# Patient Record
Sex: FEMALE | Race: WHITE | NOT HISPANIC OR LATINO | Employment: FULL TIME | URBAN - METROPOLITAN AREA
[De-identification: names, ages, dates, MRNs, and addresses within clinical notes are randomized per-mention and may not be internally consistent; named-entity substitution may affect disease eponyms.]

---

## 2023-04-05 ENCOUNTER — HOSPITAL ENCOUNTER (EMERGENCY)
Facility: CLINIC | Age: 43
Discharge: HOME | End: 2023-04-05
Attending: EMERGENCY MEDICINE | Admitting: EMERGENCY MEDICINE
Payer: COMMERCIAL

## 2023-04-05 ENCOUNTER — APPOINTMENT (OUTPATIENT)
Dept: CT IMAGING | Facility: CLINIC | Age: 43
End: 2023-04-05
Attending: EMERGENCY MEDICINE
Payer: COMMERCIAL

## 2023-04-05 ENCOUNTER — APPOINTMENT (OUTPATIENT)
Dept: MRI IMAGING | Facility: CLINIC | Age: 43
End: 2023-04-05
Attending: EMERGENCY MEDICINE
Payer: COMMERCIAL

## 2023-04-05 VITALS
HEIGHT: 60 IN | SYSTOLIC BLOOD PRESSURE: 111 MMHG | RESPIRATION RATE: 18 BRPM | OXYGEN SATURATION: 97 % | TEMPERATURE: 98.5 F | WEIGHT: 119.05 LBS | BODY MASS INDEX: 23.37 KG/M2 | HEART RATE: 72 BPM | DIASTOLIC BLOOD PRESSURE: 77 MMHG

## 2023-04-05 DIAGNOSIS — M54.50 ACUTE RIGHT-SIDED LOW BACK PAIN WITHOUT SCIATICA: ICD-10-CM

## 2023-04-05 LAB
ALBUMIN UR-MCNC: NEGATIVE MG/DL
ANION GAP SERPL CALCULATED.3IONS-SCNC: 7 MMOL/L (ref 7–15)
APPEARANCE UR: CLEAR
BASOPHILS # BLD AUTO: 0.1 10E3/UL (ref 0–0.2)
BASOPHILS NFR BLD AUTO: 1 %
BILIRUB UR QL STRIP: NEGATIVE
BUN SERPL-MCNC: 8.3 MG/DL (ref 6–20)
CALCIUM SERPL-MCNC: 9.3 MG/DL (ref 8.6–10)
CHLORIDE SERPL-SCNC: 102 MMOL/L (ref 98–107)
COLOR UR AUTO: ABNORMAL
CREAT SERPL-MCNC: 0.71 MG/DL (ref 0.51–0.95)
DEPRECATED HCO3 PLAS-SCNC: 30 MMOL/L (ref 22–29)
EOSINOPHIL # BLD AUTO: 0.1 10E3/UL (ref 0–0.7)
EOSINOPHIL NFR BLD AUTO: 2 %
ERYTHROCYTE [DISTWIDTH] IN BLOOD BY AUTOMATED COUNT: 11.9 % (ref 10–15)
GFR SERPL CREATININE-BSD FRML MDRD: >90 ML/MIN/1.73M2
GLUCOSE SERPL-MCNC: 123 MG/DL (ref 70–99)
GLUCOSE UR STRIP-MCNC: NEGATIVE MG/DL
HCG UR QL: NEGATIVE
HCT VFR BLD AUTO: 40.3 % (ref 35–47)
HGB BLD-MCNC: 12.8 G/DL (ref 11.7–15.7)
HGB UR QL STRIP: ABNORMAL
HOLD SPECIMEN: NORMAL
HOLD SPECIMEN: NORMAL
IMM GRANULOCYTES # BLD: 0 10E3/UL
IMM GRANULOCYTES NFR BLD: 0 %
KETONES UR STRIP-MCNC: NEGATIVE MG/DL
LEUKOCYTE ESTERASE UR QL STRIP: NEGATIVE
LYMPHOCYTES # BLD AUTO: 2.5 10E3/UL (ref 0.8–5.3)
LYMPHOCYTES NFR BLD AUTO: 38 %
MCH RBC QN AUTO: 27.3 PG (ref 26.5–33)
MCHC RBC AUTO-ENTMCNC: 31.8 G/DL (ref 31.5–36.5)
MCV RBC AUTO: 86 FL (ref 78–100)
MONOCYTES # BLD AUTO: 0.5 10E3/UL (ref 0–1.3)
MONOCYTES NFR BLD AUTO: 7 %
MUCOUS THREADS #/AREA URNS LPF: PRESENT /LPF
NEUTROPHILS # BLD AUTO: 3.5 10E3/UL (ref 1.6–8.3)
NEUTROPHILS NFR BLD AUTO: 52 %
NITRATE UR QL: NEGATIVE
NRBC # BLD AUTO: 0 10E3/UL
NRBC BLD AUTO-RTO: 0 /100
PH UR STRIP: 6.5 [PH] (ref 5–7)
PLATELET # BLD AUTO: 242 10E3/UL (ref 150–450)
POTASSIUM SERPL-SCNC: 3.8 MMOL/L (ref 3.4–5.3)
RBC # BLD AUTO: 4.69 10E6/UL (ref 3.8–5.2)
RBC URINE: 3 /HPF
SODIUM SERPL-SCNC: 139 MMOL/L (ref 136–145)
SP GR UR STRIP: 1.01 (ref 1–1.03)
SQUAMOUS EPITHELIAL: <1 /HPF
UROBILINOGEN UR STRIP-MCNC: NORMAL MG/DL
WBC # BLD AUTO: 6.6 10E3/UL (ref 4–11)
WBC URINE: <1 /HPF

## 2023-04-05 PROCEDURE — 74176 CT ABD & PELVIS W/O CONTRAST: CPT

## 2023-04-05 PROCEDURE — 81001 URINALYSIS AUTO W/SCOPE: CPT | Performed by: EMERGENCY MEDICINE

## 2023-04-05 PROCEDURE — 36415 COLL VENOUS BLD VENIPUNCTURE: CPT | Performed by: EMERGENCY MEDICINE

## 2023-04-05 PROCEDURE — 81025 URINE PREGNANCY TEST: CPT | Performed by: EMERGENCY MEDICINE

## 2023-04-05 PROCEDURE — 72148 MRI LUMBAR SPINE W/O DYE: CPT

## 2023-04-05 PROCEDURE — 99285 EMERGENCY DEPT VISIT HI MDM: CPT | Mod: 25

## 2023-04-05 PROCEDURE — 80048 BASIC METABOLIC PNL TOTAL CA: CPT | Performed by: EMERGENCY MEDICINE

## 2023-04-05 PROCEDURE — 85025 COMPLETE CBC W/AUTO DIFF WBC: CPT | Performed by: EMERGENCY MEDICINE

## 2023-04-05 RX ORDER — LORAZEPAM 1 MG/1
1 TABLET ORAL ONCE
Status: DISCONTINUED | OUTPATIENT
Start: 2023-04-05 | End: 2023-04-05

## 2023-04-05 RX ORDER — LORAZEPAM 0.5 MG/1
0.5 TABLET ORAL ONCE
Status: DISCONTINUED | OUTPATIENT
Start: 2023-04-05 | End: 2023-04-05 | Stop reason: HOSPADM

## 2023-04-05 ASSESSMENT — ACTIVITIES OF DAILY LIVING (ADL): ADLS_ACUITY_SCORE: 35

## 2023-04-05 NOTE — ED TRIAGE NOTES
Pt noticed right lower back pain yesterday AM that got worse around 0300. Pt take Tylenol at 0300 and tried hot pack. Pt reports pain radiating into groin. No hx kidney stones. ABCs intact. A&OX4     Triage Assessment     Row Name 04/05/23 1015       Triage Assessment (Adult)    Airway WDL WDL       Respiratory WDL    Respiratory WDL WDL       Skin Circulation/Temperature WDL    Skin Circulation/Temperature WDL WDL       Cardiac WDL    Cardiac WDL WDL       Peripheral/Neurovascular WDL    Peripheral Neurovascular WDL WDL       Cognitive/Neuro/Behavioral WDL    Cognitive/Neuro/Behavioral WDL WDL

## 2023-04-05 NOTE — ED PROVIDER NOTES
History     Chief Complaint:  Flank Pain       HPI   Vin Cordova is a 42 year old female with a history of depression who presents to the emergency department for evaluation of flank pain.  Patient reports for the past few weeks, she has had URI symptoms.  She has noted a mild discomfort to her right lower back when she coughs or sneezes, but did not think much of it.  Again yesterday morning, upon awakening, she noted persistent pain to the right lower back, with intermittent radiation towards her right groin.  With coughing and sneezing, she has noted some incontinence of urine, while talking on the telephone earlier today to the nurse triage line, noted incontinence of urine.  She otherwise denies radiation of pain down her legs or associated leg weakness/numbness.  She denies any recent history of trauma or falls.  She noted a fever about 2 weeks previously in conjunction with her URI, which is since resolved, though has felt occasional chills.  She has no history of underlying immunocompromising condition, no history of IV drug use.  She denies any previous spinal surgeries.  She currently denies any associate abdominal pain.  Her back pain is exacerbated with lying back, and alleviated by propping a pillow/warm sock underneath her lower back.  She also noted improvement with her dog lying next to her    With regards to patient's suicide screening, she denies any current thoughts of self-harm or suicide.  She reports a history of suicidal ideation years ago following a death of her parents (3 to 4 years previous).  She is coming follow-up with a therapist.  She was offered [facility] evaluation of lately declines at this time.    Independent Historian:   None - Patient Only    Review of External Notes: None     ROS:  Review of Systems  See HPI    Allergies:  Iodine     Medications:    Escitalopram    Past Medical History:    Depression    Past Surgical History:    No prior abdominal surgeries    Family History:   "  family history is not on file.    Social History:     PCP: No primary care provider on file.     Physical Exam     Patient Vitals for the past 24 hrs:   BP Temp Temp src Pulse Resp SpO2 Height Weight   04/05/23 1545 111/77 98.5  F (36.9  C) Oral 72 18 97 % -- --   04/05/23 1014 117/66 97.2  F (36.2  C) Temporal 77 16 98 % 1.518 m (4' 11.75\") 54 kg (119 lb 0.8 oz)      Physical Exam  General:   Well-nourished   Speaking in full sentences  Eyes:   Conjunctiva without injection or scleral icterus  ENT:   Moist mucous membranes   Nares patent   Pinnae normal  Neck:   Full ROM   No stiffness appreciated  Resp:   Lungs CTAB   No crackles, wheezing or audible rubs   Good air movement  CV:    Normal rate, regular rhythm   S1 and S2 present   No murmur, gallop or rub  GI:   BS present   Abdomen soft without distention   Non-tender to light and deep palpation   No guarding or rebound tenderness  Skin:   Warm, dry, well perfused   No rashes or open wounds on exposed skin  MSK:   Moves all extremities   No focal deformities or swelling   Tenderness to palpation to right lumbar paraspinal musculature   No midline thoracic or lumbar spine tenderness  Neuro:   Alert   5/5 ankle dorsi/plantarflexion   SILT in BLE   No clonus at ankles   2-3+ patellar reflexes bilaterally   Answers questions appropriately   Moves all extremities equally   Gait stable  Psych:   Normal affect, normal mood    Emergency Department Course       Imaging:  Lumbar spine MRI w/o contrast   Final Result   IMPRESSION:     1. Degenerative disc changes with disc herniation at L3-4 and L4-5 as   detailed above.   2. No significant spinal canal or neural foraminal narrowing at any   level.      BOUBACAR PEÑA MD            SYSTEM ID:  [id number]      Abd/pelvis CT no contrast - Stone Protocol   Final Result   IMPRESSION:    1.  No acute findings in the abdomen and pelvis.   2.  A 4 mm nonobstructing right renal calculus.      JENNIFER HOOVER MD            SYSTEM ID: "  [id number]         Report per radiology    Laboratory:  Labs Ordered and Resulted from Time of ED Arrival to Time of ED Departure   UA MACROSCOPIC WITH REFLEX TO MICRO AND CULTURE - Abnormal       Result Value    Color Urine Light Yellow      Appearance Urine Clear      Glucose Urine Negative      Bilirubin Urine Negative      Ketones Urine Negative      Specific Gravity Urine 1.014      Blood Urine Small (*)     pH Urine 6.5      Protein Albumin Urine Negative      Urobilinogen Urine Normal      Nitrite Urine Negative      Leukocyte Esterase Urine Negative      Mucus Urine Present (*)     RBC Urine 3 (*)     WBC Urine <1      Squamous Epithelials Urine <1     BASIC METABOLIC PANEL - Abnormal    Sodium 139      Potassium 3.8      Chloride 102      Carbon Dioxide (CO2) 30 (*)     Anion Gap 7      Urea Nitrogen 8.3      Creatinine 0.71      Calcium 9.3      Glucose 123 (*)     GFR Estimate >90     HCG QUALITATIVE URINE - Normal    hCG Urine Qualitative Negative     CBC WITH PLATELETS AND DIFFERENTIAL    WBC Count 6.6      RBC Count 4.69      Hemoglobin 12.8      Hematocrit 40.3      MCV 86      MCH 27.3      MCHC 31.8      RDW 11.9      Platelet Count 242      % Neutrophils 52      % Lymphocytes 38      % Monocytes 7      % Eosinophils 2      % Basophils 1      % Immature Granulocytes 0      NRBCs per 100 WBC 0      Absolute Neutrophils 3.5      Absolute Lymphocytes 2.5      Absolute Monocytes 0.5      Absolute Eosinophils 0.1      Absolute Basophils 0.1      Absolute Immature Granulocytes 0.0      Absolute NRBCs 0.0        Emergency Department Course & Assessments:    PSS-3    Date and Time Over the past 2 weeks have you felt down, depressed, or hopeless? Over the past 2 weeks have you had thoughts of killing yourself? Have you ever attempted to kill yourself? When did this last happen? User   04/05/23 1016 no no yes more than 6 months ago MAO            Item Assessment   Suicidal Ideation None   Plan None   Intent  None   Suicidal or self-harm behaviors None, suicidal ideation years prior (4-5 yrs) following death of parents   Risk Factors No recent loss   Protective Factors Children, future oriented         Interventions:  Medications   LORazepam (ATIVAN) tablet 0.5 mg (has no administration in time range)      Assessments:  Patient re-evaluated. MRI results discussed    Independent Interpretation (X-rays, CTs, rhythm strip):  None    Consultations/Discussion of Management or Tests:  None      Social Determinants of Health affecting care:   None    Disposition:  The patient was discharged to home.     Impression & Plan      Medical Decision Making:  Vin Cordova is a 42-year-old female presenting to the ED for evaluation of flank pain.  VS on presentation reveal no acute abnormalities.  Broad differential considered including musculoligamentous pain, lumbosacral radiculopathy, cauda equina, conus medullaris syndrome, discitis, epidural abscess, osteomyelitis, ureteral stone, ectopic pregnancy, referred ovarian pathology (torsion, cyst, TOA), UTI, appendicitis, among others.  Work-up initiated from triage, including CT of the abdomen/pelvis.  Patient noted to have incidental right-sided intrarenal stone, though no evidence of ureteral obstruction or hydronephrosis.  Remainder of imaging without evidence of acute intra-abdominal pathology to account for patient's symptoms.  Specifically, appendix appears unremarkable.  No pelvic masses are identified on pelvic structures.  Given patient's examination findings, and reports of urinary incontinence, MRI was pursued.  Fortunately, conus medullaris and cauda equina appear normal.  She was noted to have degenerative disc changes with disc herniation at L3-4 and L4-5, though without significant spinal canal or neural foraminal narrowing at any level.  On exam, she exhibits distinctly reproducible tenderness to palpation to the lumbar paraspinal musculature.  She has no abdominal  tenderness on initial repeat evaluation.  We considered referred ovarian pathology and I offered ovarian ultrasound, though clinically feel this would unlikely .  She is resting comfortably on exam.  Her symptoms are very much positional in nature, exacerbated by lying flat, and alleviated by sitting forward.  I feel this would be quite atypical for ovarian torsion.  She feels comfortable foregoing ultrasound imaging at this time.  We discussed symptomatic cares including continuation with activities of daily living, avoidance of heavy lifting, anti-inflammatory medications including alternating Tylenol/ibuprofen.  Also discussed topical lidocaine patches.  She was informed not to apply heat or patch while in place.  With regards to suicide screening questionnaire, she notes a remote history of suicidal ideation years ago following the death of her parents.  She is currently followed by therapy and on medications.  She denies any acute concerns and does not appear to be an imminent risk to herself or the others in the outpatient environment.  She was offered the opportunity to speak with [facility], though politely declines.  At this time, with reasonable clinical certainty I feel she stable for discharge home.  We discussed incidental findings including microscopic hematuria, as well as mild hyperglycemia (had M&Ms just prior to evaluation).  She will follow-up with primary care team to discuss these further and may benefit from repeat urinalysis to ensure resolution of hematuria.  Patient is encouraged to return to the ED should she develop worsening pain, lower extremity numbness, weakness, or any other new or troubling symptoms such as abdominal pain.  All questions answered prior to discharge.    Diagnosis:    ICD-10-CM    1. Acute right-sided low back pain without sciatica  M54.50            4/5/2023   Hoang Perez MD Roach, Brian Donald, MD  04/05/23 1917

## 2023-04-05 NOTE — DISCHARGE INSTRUCTIONS
Please monitor symptoms closely    Avoid heavy lifting, nothing more than 5-10 lbs until symptoms improve.    Use anti-inflammatories as needed including tylenol / ibuprofen, heat, or topical lidocaine patches    Follow-up with primary care provider.  You may have a repeat urine sample done as an outpatient to ensure the microscopic blood in your urine resolves.    Return with worsening back pain, leg weakness/numbness, loss of control of bowel or bladder function, or any other new or troubling symptoms.    Discharge Instructions  Back Pain  You were seen today for back pain. Back pain can have many causes, but most will get better without surgery or other specific treatment. Sometimes there is a herniated ( slipped ) disc. We do not usually do MRI scans to look for these right away, since most herniated discs will get better on their own with time.  Today, we did not find any evidence that your back pain was caused by a serious condition. However, sometimes symptoms develop over time and cannot be found during an emergency visit, so it is very important that you follow up with your primary provider.  Generally, every Emergency Department visit should have a follow-up clinic visit with either a primary or a specialty clinic/provider. Please follow-up as instructed by your emergency provider today.    Return to the Emergency Department if:  You develop a fever with your back pain.   You have weakness or change in sensation in one or both legs.  You lose control of your bowels or bladder, or cannot empty your bladder (cannot pee).  Your pain gets much worse.     Follow-up with your provider:  Unless your pain has completely gone away, please make an appointment with your provider within one week. Most of the routine care for back pain is available in a clinic and not the Emergency Department. You may need further management of your back pain, such as more pain medication, imaging such as an X-ray or MRI, or physical  therapy.    What can I do to help myself?  Remain Active -- People are often afraid that they will hurt their back further or delay recovery by remaining active, but this is one of the best things you can do for your back. In fact, staying in bed for a long time to rest is not recommended. Studies have shown that people with low back pain recover faster when they remain active. Movement helps to bring blood flow to the muscles and relieve muscle spasms as well as preventing loss of muscle strength.  Heat -- Using a heating pad can help with low back pain during the first few weeks. Do not sleep with a heating pad, as you can be burned.   Pain medications - You may take a pain medication such as Tylenol  (acetaminophen), Advil , Motrin  (ibuprofen) or Aleve  (naproxen).  If you were given a prescription for medicine here today, be sure to read all of the information (including the package insert) that comes with your prescription.  This will include important information about the medicine, its side effects, and any warnings that you need to know about.  The pharmacist who fills the prescription can provide more information and answer questions you may have about the medicine.  If you have questions or concerns that the pharmacist cannot address, please call or return to the Emergency Department.   Remember that you can always come back to the Emergency Department if you are not able to see your regular provider in the amount of time listed above, if you get any new symptoms, or if there is anything that worries you.